# Patient Record
Sex: MALE | Race: WHITE | HISPANIC OR LATINO | URBAN - METROPOLITAN AREA
[De-identification: names, ages, dates, MRNs, and addresses within clinical notes are randomized per-mention and may not be internally consistent; named-entity substitution may affect disease eponyms.]

---

## 2022-04-05 ENCOUNTER — EMERGENCY (EMERGENCY)
Facility: HOSPITAL | Age: 72
LOS: 1 days | Discharge: HOME | End: 2022-04-05
Attending: EMERGENCY MEDICINE
Payer: SELF-PAY

## 2022-04-05 VITALS
HEIGHT: 60 IN | TEMPERATURE: 98 F | HEART RATE: 64 BPM | WEIGHT: 156.09 LBS | SYSTOLIC BLOOD PRESSURE: 144 MMHG | DIASTOLIC BLOOD PRESSURE: 84 MMHG | OXYGEN SATURATION: 99 % | RESPIRATION RATE: 16 BRPM

## 2022-04-05 PROCEDURE — 73610 X-RAY EXAM OF ANKLE: CPT | Mod: 26,50

## 2022-04-05 PROCEDURE — 99284 EMERGENCY DEPT VISIT MOD MDM: CPT | Mod: 25

## 2022-04-05 PROCEDURE — 73700 CT LOWER EXTREMITY W/O DYE: CPT | Mod: 26,RT,MD

## 2022-04-05 PROCEDURE — 90471 IMMUNIZATION ADMIN: CPT

## 2022-04-05 PROCEDURE — 73630 X-RAY EXAM OF FOOT: CPT

## 2022-04-05 PROCEDURE — 99285 EMERGENCY DEPT VISIT HI MDM: CPT

## 2022-04-05 PROCEDURE — 90714 TD VACC NO PRESV 7 YRS+ IM: CPT

## 2022-04-05 PROCEDURE — 73610 X-RAY EXAM OF ANKLE: CPT

## 2022-04-05 PROCEDURE — 96372 THER/PROPH/DIAG INJ SC/IM: CPT

## 2022-04-05 PROCEDURE — 73700 CT LOWER EXTREMITY W/O DYE: CPT | Mod: MD

## 2022-04-05 PROCEDURE — 73630 X-RAY EXAM OF FOOT: CPT | Mod: 26,RT

## 2022-04-05 RX ORDER — OXYCODONE AND ACETAMINOPHEN 5; 325 MG/1; MG/1
1 TABLET ORAL ONCE
Refills: 0 | Status: DISCONTINUED | OUTPATIENT
Start: 2022-04-05 | End: 2022-04-05

## 2022-04-05 RX ORDER — KETOROLAC TROMETHAMINE 30 MG/ML
30 SYRINGE (ML) INJECTION ONCE
Refills: 0 | Status: DISCONTINUED | OUTPATIENT
Start: 2022-04-05 | End: 2022-04-05

## 2022-04-05 RX ORDER — ACETAMINOPHEN 500 MG
650 TABLET ORAL ONCE
Refills: 0 | Status: COMPLETED | OUTPATIENT
Start: 2022-04-05 | End: 2022-04-05

## 2022-04-05 RX ORDER — TETANUS AND DIPHTHERIA TOXOIDS ADSORBED 2; 2 [LF]/.5ML; [LF]/.5ML
0.5 INJECTION INTRAMUSCULAR ONCE
Refills: 0 | Status: COMPLETED | OUTPATIENT
Start: 2022-04-05 | End: 2022-04-05

## 2022-04-05 RX ADMIN — Medication 650 MILLIGRAM(S): at 19:00

## 2022-04-05 RX ADMIN — OXYCODONE AND ACETAMINOPHEN 1 TABLET(S): 5; 325 TABLET ORAL at 23:21

## 2022-04-05 RX ADMIN — Medication 30 MILLIGRAM(S): at 19:00

## 2022-04-05 RX ADMIN — TETANUS AND DIPHTHERIA TOXOIDS ADSORBED 0.5 MILLILITER(S): 2; 2 INJECTION INTRAMUSCULAR at 19:01

## 2022-04-05 NOTE — ED PROVIDER NOTE - CARE PROVIDER_API CALL
Avinash Soto (DPM)  Orthopaedic Surgery Surgery  [address] [place] [address]  Phone: [phone]  Follow Up Time: 1-3 Days

## 2022-04-05 NOTE — ED PROVIDER NOTE - PATIENT PORTAL LINK FT
You can access the FollowMyHealth Patient Portal offered by [facility] by registering at the following website: [email or web address]. By joining [facility]’s FollowMyHealth portal, you will also be able to view your health information using other applications (apps) compatible with our system.

## 2022-04-05 NOTE — ED PROVIDER NOTE - NSFOLLOWUPINSTRUCTIONS_ED_ALL_ED_FT
- Nayana un seguimiento con bravo médico de atención primaria en 1 o 2 días. Si no puede hacer un seguimiento con bravo médico de atención primaria, regrese al servicio de urgencias por cualquier problema urgente.  - Busque atención médica inmediata ante cualquier signo o síntoma nuevo, que empeore o que le preocupe.  - Se le entregaron copias de todos los resultados de teofilo pruebas, llévelas a bravo médico de atención primaria para que revise los resultados anormales  - seguimiento con podólogo por fractura de pie y dolor de tobillo  - poner peso en la pierna izquierda según lo tolere  - solo ponga peso usando bravo talón en el pie derecho    - Si tiene dificultades para realizar un seguimiento, roland al: [phone] o visite [email or web address] para obtener un médico o especialista de [facility] que acepte bravo seguro en bravo área.    ¡Sentirse mejor!

## 2022-04-05 NOTE — ED PROVIDER NOTE - NS ED ATTENDING STATEMENT MOD
This was a shared visit with the FABIO. I reviewed and verified the documentation and independently performed the documented:

## 2022-04-05 NOTE — CONSULT NOTE ADULT - SUBJECTIVE AND OBJECTIVE BOX
HPI: Patient presents to the ED for right foot and left ankle injury.  VSS.   present for discussions with patient. no PMH.  patient states right foot was caught and crushed between a wall and a machine which resulted in him falling and sustaining left lateral ankle pain.  no other injury.  no head injury.  family bedside.  Patient unable to bear weight.  NV intact.  Non toxic.  Well appearing. No aggravating or relieving factors. No other pertinent PMH.  No other pertinent PSH.  No other pertinent FHx.  Patient denies EtOH/tobacco/illicit substance use. No chest pain/palpitations, no SOB/cough/wheeze/stridor, No abdominal pain,  No neck/back pain, no rash, no changes in neurological status/function.    Podiatry HPI: Full history as above. Podiatry consulted regarding 72y year old Male patient regarding R foot pain. patient rates his pain as 10/10, but states that it greatly improved with toradol which was given in the ED. Also notes pain to his left ankle, which he states after he fell.     Patient denies fevers, chills, nausea, or vomiting    Medications   FH,   PMH   PSH    Labs              Vital Signs Last 24 Hrs  T(C): 36.7 (05 Apr 2022 16:45), Max: 36.7 (05 Apr 2022 16:45)  T(F): 98 (05 Apr 2022 16:45), Max: 98 (05 Apr 2022 16:45)  HR: 64 (05 Apr 2022 16:45) (64 - 64)  BP: 144/84 (05 Apr 2022 16:45) (144/84 - 144/84)  BP(mean): --  RR: 16 (05 Apr 2022 16:45) (16 - 16)  SpO2: 99% (05 Apr 2022 16:45) (99% - 99%)              ROS: All others negative unless otherwise stated in the HPI    Physical exam:  Vascular: DP, PT palpable bilaterally. CFT intact to all digits  Derm: No open lesions. No clinical signs of infection. Edematous, ecchymotic changes to the dorsolateral midfoot/forefoot.   Neuro: Protective sensation grossly intact. Sensation intact to all digits.  MSK: Patient able to move all extremities. patient able to wiggle all digits. tender to palpation, light touch of the right foot dorsum. Tender to palpation over right lateral malleoli.     Assessment:  Crush injury, soft tissue swelling    Plan:  Patient evaluated, chart reviewed  Reviewed X ray, CT scans  No displacement noted. Pending official read  Applied Todd compression dressing  Pulses palpable. sensation intact, patient able to move all digits. low concern for compartment syndrome  Recommend follow up with Dr. Soto within 1 week of discharge  WBAT BLE  Stable from podiatric standpoint  Discussed with attending Dr. Soto HPI: Patient presents to the ED for right foot and left ankle injury.  VSS.   present for discussions with patient. no PMH.  patient states right foot was caught and crushed between a wall and a machine which resulted in him falling and sustaining left lateral ankle pain.  no other injury.  no head injury.  family bedside.  Patient unable to bear weight.  NV intact.  Non toxic.  Well appearing. No aggravating or relieving factors. No other pertinent PMH.  No other pertinent PSH.  No other pertinent FHx.  Patient denies EtOH/tobacco/illicit substance use. No chest pain/palpitations, no SOB/cough/wheeze/stridor, No abdominal pain,  No neck/back pain, no rash, no changes in neurological status/function.    Podiatry HPI: Full history as above. Podiatry consulted regarding 72y year old Male patient regarding R foot pain. patient rates his pain as 10/10, but states that it greatly improved with toradol which was given in the ED. Also notes pain to his left ankle, which he states after he fell.     Patient denies fevers, chills, nausea, or vomiting    Medications   FH,   PMH   PSH    Labs              Vital Signs Last 24 Hrs  T(C): 36.7 (05 Apr 2022 16:45), Max: 36.7 (05 Apr 2022 16:45)  T(F): 98 (05 Apr 2022 16:45), Max: 98 (05 Apr 2022 16:45)  HR: 64 (05 Apr 2022 16:45) (64 - 64)  BP: 144/84 (05 Apr 2022 16:45) (144/84 - 144/84)  BP(mean): --  RR: 16 (05 Apr 2022 16:45) (16 - 16)  SpO2: 99% (05 Apr 2022 16:45) (99% - 99%)              ROS: All others negative unless otherwise stated in the HPI    Physical exam:  Vascular: DP, PT palpable bilaterally. CFT intact to all digits  Derm: No open lesions. No clinical signs of infection. Edematous, ecchymotic changes to the dorsolateral midfoot/forefoot.   Neuro: Protective sensation grossly intact. Sensation intact to all digits.  MSK: Patient able to move all extremities. patient able to wiggle all digits. tender to palpation, light touch of the right foot dorsum. Tender to palpation over right lateral malleoli.     Assessment:  Crush injury, soft tissue swelling    Plan:  Patient evaluated, chart reviewed  Reviewed X ray, CT scans  No displacement noted.   CT official read showing age indeterminate fractures along right foot hallux, nondisplaced  Applied Todd compression dressing  Pulses palpable. sensation intact, patient able to move all digits. low concern for compartment syndrome  Recommend follow up with Dr. Soto within 1 week of discharge  WBAT LLE. Heel weight bearing only RLE. Recommend surgical shoe.  Stable from podiatric standpoint  Discussed with attending Dr. Soto HPI: Patient presents to the ED for right foot and left ankle injury.  VSS.   present for discussions with patient. no PMH.  patient states right foot was caught and crushed between a wall and a machine which resulted in him falling and sustaining left lateral ankle pain.  no other injury.  no head injury.  family bedside.  Patient unable to bear weight.  NV intact.  Non toxic.  Well appearing. No aggravating or relieving factors. No other pertinent PMH.  No other pertinent PSH.  No other pertinent FHx.  Patient denies EtOH/tobacco/illicit substance use. No chest pain/palpitations, no SOB/cough/wheeze/stridor, No abdominal pain,  No neck/back pain, no rash, no changes in neurological status/function.    Podiatry HPI: Full history as above. Podiatry consulted regarding 72y year old Male patient regarding R foot pain. patient rates his pain as 10/10, but states that it greatly improved with toradol which was given in the ED. Also notes pain to his left ankle, which he states after he fell.     Patient denies fevers, chills, nausea, or vomiting    Medications   FH,   PMH   PSH    Labs              Vital Signs Last 24 Hrs  T(C): 36.7 (05 Apr 2022 16:45), Max: 36.7 (05 Apr 2022 16:45)  T(F): 98 (05 Apr 2022 16:45), Max: 98 (05 Apr 2022 16:45)  HR: 64 (05 Apr 2022 16:45) (64 - 64)  BP: 144/84 (05 Apr 2022 16:45) (144/84 - 144/84)  BP(mean): --  RR: 16 (05 Apr 2022 16:45) (16 - 16)  SpO2: 99% (05 Apr 2022 16:45) (99% - 99%)              ROS: All others negative unless otherwise stated in the HPI    Physical exam:  Vascular: DP, PT palpable bilaterally. CFT intact to all digits  Derm: No open lesions. No clinical signs of infection. Edematous, ecchymotic changes to the dorsolateral midfoot/forefoot.   Neuro: Protective sensation grossly intact. Sensation intact to all digits.  MSK: Patient able to move all extremities. patient able to wiggle all digits. tender to palpation, light touch of the right foot dorsum. Tender to palpation over right lateral malleoli.     Assessment:  Crush injury, soft tissue swelling    Plan:  Patient evaluated, chart reviewed  Reviewed X ray, CT scans  No displacement noted.   CT official read showing age indeterminate fractures along right foot hallux, nondisplaced  Applied Todd compression dressing  Pulses palpable. sensation intact, patient able to move all digits. low concern for compartment syndrome  Recommend follow up with Dr. Soto within 1 week of discharge  WBAT LLE. Heel weight bearing only RLE. Recommend surgical shoe.  Stable from podiatric standpoint  Discussed with attending Dr. Soto      Patient was examined.  All documentation reviewed.  Discussed pathology and treatment plan with resident.  Reviewed written documentation and agreed with the above.  Patient will be followed while in house

## 2022-04-05 NOTE — ED PROVIDER NOTE - PHYSICAL EXAMINATION
Gen: Alert, NAD  Head: NC, AT, PERRL, EOMI, normal lids/conjunctiva  ENT: normal hearing, patent oropharynx   Neck: +supple, no tenderness/meningismus/JVD, +Trachea midline  Pulm: Bilateral BS, normal resp effort, no wheeze/stridor/retractions  CV: RRR, no R/G, +dist pulses  Abd: soft, NT/ND, +BS, no hepatosplenomegaly  Mskel: no cyanosis, left ankle with mild edema and TTP left lateral malleolus and NV intact, right foot with midfoot TTP, small abrasion between 4/5 digit and lateral foot, right foot with edema, ecchymosis, NV intact  Skin: no rash  Neuro: AAOx3, no gross sensory/motor deficits,

## 2022-04-05 NOTE — ED PROVIDER NOTE - OBJECTIVE STATEMENT
Pertinent PMH/PSH/FHx/SHx and Review of Systems contained within:  Patient presents to the ED for right foot and left ankle injury.  VSS.   present for discussions with patient. no PMH.  patient states right foot was caught and crushed between a wall and a machine which resulted in him falling and sustaining left lateral ankle pain.  no other injury.  no head injury.  family bedside.  Patient unable to bear weight.  NV intact.  Non toxic.  Well appearing. No aggravating or relieving factors. No other pertinent PMH.  No other pertinent PSH.  No other pertinent FHx.  Patient denies EtOH/tobacco/illicit substance use. No chest pain/palpitations, no SOB/cough/wheeze/stridor, No abdominal pain,  No neck/back pain, no rash, no changes in neurological status/function.

## 2022-04-05 NOTE — ED PROVIDER NOTE - PROGRESS NOTE DETAILS
Podiatry consulted.  CT pending LUCIE Watts NOTE: Pt evaluated at bedside with daughter.   Pt evaluated prior by intake physician. Otherwise HPI/PE/ROS as noted above. Will follow up plan per intake physician. LUCIE Watts NOTE: CT shows "Question age-indeterminate fracture fragments along the right lateral   hallux sesamoid/first metatarsal articulation as described." Pt seen by podiatry who recommends WBAT LLE. Heel weight bearing only RLE. Recommend surgical shoe. Pt also given cane. Pt able to ambulate, offered to stay to see physical therapy in AM but pt declined. Reviewed all results and plan. Pt stable for d/c, reports improvement, VSS, tolerating PO, ambulatory.  Discussion includes results, plan, and return precautions. Pt advised to f/u with PMD 1-2 days and specialists discussed.  Printed copies of available lab/radiology results contained within discharge packet. Pt verbalized understanding/agreement of plan. Son at bedside for translation

## 2022-04-05 NOTE — ED PROVIDER NOTE - CLINICAL SUMMARY MEDICAL DECISION MAKING FREE TEXT BOX
Patient with B ankle injury and right foot injury.  As interpreted by undersigned physician, xrays demonstrate no acute pathology. CT with concern for fracture.  Podiatry consulted and placed in splint.  will f/u.  ambulatory in ED.  FAmily will support.   present for discussions with patient. Non toxic.  Well appearing. Uneventful ED observation period. Discussed signs and symptoms and reasons for return with good teachback.

## 2022-04-05 NOTE — ED PROCEDURE NOTE - CPROC ED TIME OUT STATEMENT1
“Patient's name, , procedure and correct site were confirmed during the [place] Timeout.”
“Patient's name, , procedure and correct site were confirmed during the [place] Timeout.”

## 2022-04-05 NOTE — ED ADULT TRIAGE NOTE - CHIEF COMPLAINT QUOTE
Pt was at work and his right foot was run over with a machine causing him to fall. Pt c/o pain to right foot and left ankle

## 2022-04-05 NOTE — ED ADULT NURSE NOTE - OBJECTIVE STATEMENT
Report received at 1915, care assumed.  72 year old male presents to ED for right foot and left ankle injury.  Patient states right foot was caught and crushed between a wall and a machine which resulted in him falling and sustaining left lateral ankle pain.  no other injury.  Right foot treated and wrapped by provider.  Patient medicated for pain as ordered.  Patient medically cleared for discharge.  Patient accompanied by family member and left ED via wheelchair

## 2022-07-08 PROBLEM — Z00.00 ENCOUNTER FOR PREVENTIVE HEALTH EXAMINATION: Status: ACTIVE | Noted: 2022-07-08

## 2023-06-08 ENCOUNTER — OFFICE (OUTPATIENT)
Dept: URBAN - METROPOLITAN AREA CLINIC 104 | Facility: CLINIC | Age: 73
Setting detail: OPHTHALMOLOGY
End: 2023-06-08
Payer: COMMERCIAL

## 2023-06-08 ENCOUNTER — RX ONLY (RX ONLY)
Age: 73
End: 2023-06-08

## 2023-06-08 DIAGNOSIS — H01.001: ICD-10-CM

## 2023-06-08 DIAGNOSIS — H40.1132: ICD-10-CM

## 2023-06-08 DIAGNOSIS — H43.813: ICD-10-CM

## 2023-06-08 DIAGNOSIS — Z96.1: ICD-10-CM

## 2023-06-08 DIAGNOSIS — H01.004: ICD-10-CM

## 2023-06-08 DIAGNOSIS — H26.492: ICD-10-CM

## 2023-06-08 DIAGNOSIS — H04.123: ICD-10-CM

## 2023-06-08 PROCEDURE — 92014 COMPRE OPH EXAM EST PT 1/>: CPT | Performed by: OPTOMETRIST

## 2023-06-08 PROCEDURE — 92133 CPTRZD OPH DX IMG PST SGM ON: CPT | Performed by: OPTOMETRIST

## 2023-06-08 ASSESSMENT — CONFRONTATIONAL VISUAL FIELD TEST (CVF)
OD_FINDINGS: FULL
OS_FINDINGS: FULL

## 2023-06-08 ASSESSMENT — KERATOMETRY
OS_AXISANGLE_DEGREES: 004
OD_AXISANGLE_DEGREES: 111
OS_K2POWER_DIOPTERS: 43.38
OD_K2POWER_DIOPTERS: 43.38
OS_K1POWER_DIOPTERS: 42.88
OD_K1POWER_DIOPTERS: 42.94

## 2023-06-08 ASSESSMENT — PACHYMETRY
OS_CT_CORRECTION: 2
OD_CT_CORRECTION: 4
OD_CT_UM: 499
OS_CT_UM: 515

## 2023-06-08 ASSESSMENT — VISUAL ACUITY
OS_BCVA: 20/25
OD_BCVA: 20/20-2

## 2023-06-08 ASSESSMENT — AXIALLENGTH_DERIVED
OD_AL: 23.82
OS_AL: 23.5332

## 2023-06-08 ASSESSMENT — TONOMETRY
OS_IOP_MMHG: 20
OD_IOP_MMHG: 20

## 2023-06-08 ASSESSMENT — LID EXAM ASSESSMENTS
OS_BLEPHARITIS: LUL 1+ 2+
OD_BLEPHARITIS: RUL 1+ 2+

## 2023-06-08 ASSESSMENT — REFRACTION_AUTOREFRACTION
OD_AXIS: 021
OD_CYLINDER: -1.00
OD_SPHERE: +0.25
OS_CYLINDER: -1.00
OS_AXIS: 149
OS_SPHERE: +1.00

## 2023-06-08 ASSESSMENT — SPHEQUIV_DERIVED
OD_SPHEQUIV: -0.25
OS_SPHEQUIV: 0.5

## 2023-06-08 ASSESSMENT — SUPERFICIAL PUNCTATE KERATITIS (SPK)
OD_SPK: 1+
OS_SPK: 1+

## 2023-06-23 ENCOUNTER — OFFICE (OUTPATIENT)
Dept: URBAN - METROPOLITAN AREA CLINIC 104 | Facility: CLINIC | Age: 73
Setting detail: OPHTHALMOLOGY
End: 2023-06-23
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
## 2023-06-23 PROCEDURE — NO SHOW FE NO SHOW FEE: Performed by: OPTOMETRIST

## 2024-01-30 ENCOUNTER — OFFICE (OUTPATIENT)
Dept: URBAN - METROPOLITAN AREA CLINIC 104 | Facility: CLINIC | Age: 74
Setting detail: OPHTHALMOLOGY
End: 2024-01-30
Payer: COMMERCIAL

## 2024-01-30 DIAGNOSIS — H26.492: ICD-10-CM

## 2024-01-30 DIAGNOSIS — H04.123: ICD-10-CM

## 2024-01-30 DIAGNOSIS — H01.001: ICD-10-CM

## 2024-01-30 DIAGNOSIS — H40.1132: ICD-10-CM

## 2024-01-30 DIAGNOSIS — H01.004: ICD-10-CM

## 2024-01-30 PROCEDURE — 92083 EXTENDED VISUAL FIELD XM: CPT | Performed by: OPTOMETRIST

## 2024-01-30 PROCEDURE — 92012 INTRM OPH EXAM EST PATIENT: CPT | Performed by: OPTOMETRIST

## 2024-01-30 ASSESSMENT — REFRACTION_MANIFEST
OD_CYLINDER: -1.00
OD_SPHERE: PLANO
OS_VA1: 20/20
OS_SPHERE: PLANO
OD_AXIS: 020
OS_AXIS: 165
OD_ADD: +2.50
OD_VA1: 20/20
OS_ADD: +2.50
OS_CYLINDER: -0.50

## 2024-01-30 ASSESSMENT — REFRACTION_AUTOREFRACTION
OD_SPHERE: +0.50
OS_CYLINDER: -1.00
OS_SPHERE: +0.75
OD_AXIS: 026
OD_CYLINDER: -1.00
OS_AXIS: 140

## 2024-01-30 ASSESSMENT — SPHEQUIV_DERIVED
OD_SPHEQUIV: 0
OS_SPHEQUIV: 0.25

## 2024-01-30 ASSESSMENT — SUPERFICIAL PUNCTATE KERATITIS (SPK)
OS_SPK: 2+
OD_SPK: 2+

## 2024-01-30 ASSESSMENT — CONFRONTATIONAL VISUAL FIELD TEST (CVF)
OS_FINDINGS: FULL
OD_FINDINGS: FULL

## 2024-01-30 ASSESSMENT — LID EXAM ASSESSMENTS
OS_BLEPHARITIS: LUL 1+ 2+
OD_BLEPHARITIS: RUL 1+ 2+

## 2024-05-30 ENCOUNTER — OFFICE (OUTPATIENT)
Dept: URBAN - METROPOLITAN AREA CLINIC 104 | Facility: CLINIC | Age: 74
Setting detail: OPHTHALMOLOGY
End: 2024-05-30
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
## 2024-05-30 PROCEDURE — NO SHOW FE NO SHOW FEE: Performed by: OPTOMETRIST

## 2025-04-09 ENCOUNTER — OFFICE (OUTPATIENT)
Dept: URBAN - METROPOLITAN AREA CLINIC 104 | Facility: CLINIC | Age: 75
Setting detail: OPHTHALMOLOGY
End: 2025-04-09
Payer: COMMERCIAL

## 2025-04-09 DIAGNOSIS — H04.123: ICD-10-CM

## 2025-04-09 DIAGNOSIS — H01.001: ICD-10-CM

## 2025-04-09 DIAGNOSIS — H40.1132: ICD-10-CM

## 2025-04-09 DIAGNOSIS — H01.004: ICD-10-CM

## 2025-04-09 PROBLEM — H11.042 PTERYGIUM-PERIPHERAL; LEFT EYE: Status: ACTIVE | Noted: 2025-04-09

## 2025-04-09 PROCEDURE — 99213 OFFICE O/P EST LOW 20 MIN: CPT | Performed by: OPTOMETRIST

## 2025-04-09 ASSESSMENT — SUPERFICIAL PUNCTATE KERATITIS (SPK)
OD_SPK: 2+
OS_SPK: 2+

## 2025-04-09 ASSESSMENT — PACHYMETRY
OD_CT_CORRECTION: 4
OS_CT_CORRECTION: 2
OS_CT_UM: 515
OD_CT_UM: 499

## 2025-04-09 ASSESSMENT — LID EXAM ASSESSMENTS
OS_BLEPHARITIS: LUL 1+ 2+
OD_BLEPHARITIS: RUL 1+ 2+

## 2025-04-09 ASSESSMENT — TONOMETRY
OD_IOP_MMHG: 18
OS_IOP_MMHG: 17

## 2025-04-09 ASSESSMENT — CONFRONTATIONAL VISUAL FIELD TEST (CVF)
OD_FINDINGS: FULL
OS_FINDINGS: FULL

## 2025-04-09 ASSESSMENT — VISUAL ACUITY
OD_BCVA: 20/20
OS_BCVA: 20/20

## 2025-04-09 ASSESSMENT — CORNEAL PTERYGIUM: OS_PTERYGIUM: NASAL

## 2025-04-27 ENCOUNTER — EMERGENCY (EMERGENCY)
Facility: HOSPITAL | Age: 75
LOS: 1 days | End: 2025-04-27
Attending: EMERGENCY MEDICINE
Payer: COMMERCIAL

## 2025-04-27 VITALS
DIASTOLIC BLOOD PRESSURE: 80 MMHG | OXYGEN SATURATION: 99 % | WEIGHT: 142.2 LBS | HEART RATE: 57 BPM | RESPIRATION RATE: 18 BRPM | SYSTOLIC BLOOD PRESSURE: 156 MMHG | TEMPERATURE: 98 F | HEIGHT: 60 IN

## 2025-04-27 LAB
ALBUMIN SERPL ELPH-MCNC: 3.8 G/DL — SIGNIFICANT CHANGE UP (ref 3.3–5.2)
ALP SERPL-CCNC: 96 U/L — SIGNIFICANT CHANGE UP (ref 40–120)
ALT FLD-CCNC: 22 U/L — SIGNIFICANT CHANGE UP
ANION GAP SERPL CALC-SCNC: 10 MMOL/L — SIGNIFICANT CHANGE UP (ref 5–17)
AST SERPL-CCNC: 18 U/L — SIGNIFICANT CHANGE UP
BASOPHILS # BLD AUTO: 0.03 K/UL — SIGNIFICANT CHANGE UP (ref 0–0.2)
BASOPHILS NFR BLD AUTO: 0.4 % — SIGNIFICANT CHANGE UP (ref 0–2)
BILIRUB SERPL-MCNC: 0.2 MG/DL — LOW (ref 0.4–2)
BUN SERPL-MCNC: 16.5 MG/DL — SIGNIFICANT CHANGE UP (ref 8–20)
CALCIUM SERPL-MCNC: 9.2 MG/DL — SIGNIFICANT CHANGE UP (ref 8.4–10.5)
CHLORIDE SERPL-SCNC: 101 MMOL/L — SIGNIFICANT CHANGE UP (ref 96–108)
CO2 SERPL-SCNC: 28 MMOL/L — SIGNIFICANT CHANGE UP (ref 22–29)
CREAT SERPL-MCNC: 1.02 MG/DL — SIGNIFICANT CHANGE UP (ref 0.5–1.3)
D DIMER BLD IA.RAPID-MCNC: <150 NG/ML DDU — SIGNIFICANT CHANGE UP
EGFR: 76 ML/MIN/1.73M2 — SIGNIFICANT CHANGE UP
EGFR: 76 ML/MIN/1.73M2 — SIGNIFICANT CHANGE UP
EOSINOPHIL # BLD AUTO: 0.09 K/UL — SIGNIFICANT CHANGE UP (ref 0–0.5)
EOSINOPHIL NFR BLD AUTO: 1.3 % — SIGNIFICANT CHANGE UP (ref 0–6)
FLUAV AG NPH QL: SIGNIFICANT CHANGE UP
FLUBV AG NPH QL: SIGNIFICANT CHANGE UP
GLUCOSE SERPL-MCNC: 95 MG/DL — SIGNIFICANT CHANGE UP (ref 70–99)
HCT VFR BLD CALC: 34.8 % — LOW (ref 39–50)
HGB BLD-MCNC: 11.5 G/DL — LOW (ref 13–17)
IMM GRANULOCYTES # BLD AUTO: 0.03 K/UL — SIGNIFICANT CHANGE UP (ref 0–0.07)
IMM GRANULOCYTES NFR BLD AUTO: 0.4 % — SIGNIFICANT CHANGE UP (ref 0–0.9)
LYMPHOCYTES # BLD AUTO: 1.16 K/UL — SIGNIFICANT CHANGE UP (ref 1–3.3)
LYMPHOCYTES NFR BLD AUTO: 16.8 % — SIGNIFICANT CHANGE UP (ref 13–44)
MCHC RBC-ENTMCNC: 30.7 PG — SIGNIFICANT CHANGE UP (ref 27–34)
MCHC RBC-ENTMCNC: 33 G/DL — SIGNIFICANT CHANGE UP (ref 32–36)
MCV RBC AUTO: 92.8 FL — SIGNIFICANT CHANGE UP (ref 80–100)
MONOCYTES # BLD AUTO: 0.74 K/UL — SIGNIFICANT CHANGE UP (ref 0–0.9)
MONOCYTES NFR BLD AUTO: 10.7 % — SIGNIFICANT CHANGE UP (ref 2–14)
NEUTROPHILS # BLD AUTO: 4.87 K/UL — SIGNIFICANT CHANGE UP (ref 1.8–7.4)
NEUTROPHILS NFR BLD AUTO: 70.4 % — SIGNIFICANT CHANGE UP (ref 43–77)
NRBC # BLD AUTO: 0 K/UL — SIGNIFICANT CHANGE UP (ref 0–0)
NRBC # FLD: 0 K/UL — SIGNIFICANT CHANGE UP (ref 0–0)
NRBC BLD AUTO-RTO: 0 /100 WBCS — SIGNIFICANT CHANGE UP (ref 0–0)
NT-PROBNP SERPL-SCNC: 170 PG/ML — SIGNIFICANT CHANGE UP (ref 0–300)
PLATELET # BLD AUTO: 254 K/UL — SIGNIFICANT CHANGE UP (ref 150–400)
PMV BLD: 10.8 FL — SIGNIFICANT CHANGE UP (ref 7–13)
POTASSIUM SERPL-MCNC: 4.1 MMOL/L — SIGNIFICANT CHANGE UP (ref 3.5–5.3)
POTASSIUM SERPL-SCNC: 4.1 MMOL/L — SIGNIFICANT CHANGE UP (ref 3.5–5.3)
PROT SERPL-MCNC: 6.2 G/DL — LOW (ref 6.6–8.7)
RBC # BLD: 3.75 M/UL — LOW (ref 4.2–5.8)
RBC # FLD: 13.2 % — SIGNIFICANT CHANGE UP (ref 10.3–14.5)
RSV RNA NPH QL NAA+NON-PROBE: SIGNIFICANT CHANGE UP
SARS-COV-2 RNA SPEC QL NAA+PROBE: SIGNIFICANT CHANGE UP
SODIUM SERPL-SCNC: 139 MMOL/L — SIGNIFICANT CHANGE UP (ref 135–145)
SOURCE RESPIRATORY: SIGNIFICANT CHANGE UP
TROPONIN T, HIGH SENSITIVITY RESULT: 10 NG/L — SIGNIFICANT CHANGE UP (ref 0–51)
TROPONIN T, HIGH SENSITIVITY RESULT: 12 NG/L — SIGNIFICANT CHANGE UP (ref 0–51)
TROPONIN T, HIGH SENSITIVITY RESULT: 12 NG/L — SIGNIFICANT CHANGE UP (ref 0–51)
WBC # BLD: 6.92 K/UL — SIGNIFICANT CHANGE UP (ref 3.8–10.5)
WBC # FLD AUTO: 6.92 K/UL — SIGNIFICANT CHANGE UP (ref 3.8–10.5)

## 2025-04-27 PROCEDURE — 71250 CT THORAX DX C-: CPT | Mod: 26

## 2025-04-27 PROCEDURE — 93010 ELECTROCARDIOGRAM REPORT: CPT

## 2025-04-27 PROCEDURE — 71046 X-RAY EXAM CHEST 2 VIEWS: CPT | Mod: 26

## 2025-04-27 PROCEDURE — 99285 EMERGENCY DEPT VISIT HI MDM: CPT

## 2025-04-27 PROCEDURE — 74177 CT ABD & PELVIS W/CONTRAST: CPT | Mod: 26

## 2025-04-27 PROCEDURE — 93306 TTE W/DOPPLER COMPLETE: CPT | Mod: 26

## 2025-04-27 RX ORDER — DORZOLAMIDE 20 MG/ML
1 SOLUTION/ DROPS OPHTHALMIC
Refills: 0 | Status: DISCONTINUED | OUTPATIENT
Start: 2025-04-27 | End: 2025-05-04

## 2025-04-27 RX ORDER — TAMSULOSIN HYDROCHLORIDE 0.4 MG/1
0.4 CAPSULE ORAL AT BEDTIME
Refills: 0 | Status: DISCONTINUED | OUTPATIENT
Start: 2025-04-27 | End: 2025-05-04

## 2025-04-27 RX ORDER — ATORVASTATIN CALCIUM 80 MG/1
20 TABLET, FILM COATED ORAL AT BEDTIME
Refills: 0 | Status: DISCONTINUED | OUTPATIENT
Start: 2025-04-27 | End: 2025-05-04

## 2025-04-27 RX ORDER — ALBUTEROL SULFATE 2.5 MG/3ML
2.5 VIAL, NEBULIZER (ML) INHALATION ONCE
Refills: 0 | Status: COMPLETED | OUTPATIENT
Start: 2025-04-27 | End: 2025-04-27

## 2025-04-27 RX ORDER — ACETAMINOPHEN 500 MG/5ML
1000 LIQUID (ML) ORAL ONCE
Refills: 0 | Status: COMPLETED | OUTPATIENT
Start: 2025-04-27 | End: 2025-04-27

## 2025-04-27 RX ADMIN — Medication 2.5 MILLIGRAM(S): at 13:07

## 2025-04-27 RX ADMIN — Medication 400 MILLIGRAM(S): at 13:07

## 2025-04-28 ENCOUNTER — RESULT REVIEW (OUTPATIENT)
Age: 75
End: 2025-04-28

## 2025-04-28 ENCOUNTER — TRANSCRIPTION ENCOUNTER (OUTPATIENT)
Age: 75
End: 2025-04-28

## 2025-04-28 VITALS
RESPIRATION RATE: 17 BRPM | SYSTOLIC BLOOD PRESSURE: 142 MMHG | TEMPERATURE: 98 F | DIASTOLIC BLOOD PRESSURE: 82 MMHG | OXYGEN SATURATION: 96 % | HEART RATE: 57 BPM

## 2025-04-28 LAB
APPEARANCE UR: CLEAR — SIGNIFICANT CHANGE UP
BACTERIA # UR AUTO: NEGATIVE /HPF — SIGNIFICANT CHANGE UP
BILIRUB UR-MCNC: NEGATIVE — SIGNIFICANT CHANGE UP
CAST: 0 /LPF — SIGNIFICANT CHANGE UP (ref 0–4)
COLOR SPEC: YELLOW — SIGNIFICANT CHANGE UP
DIFF PNL FLD: NEGATIVE — SIGNIFICANT CHANGE UP
GLUCOSE UR QL: NEGATIVE MG/DL — SIGNIFICANT CHANGE UP
KETONES UR-MCNC: NEGATIVE MG/DL — SIGNIFICANT CHANGE UP
LEUKOCYTE ESTERASE UR-ACNC: NEGATIVE — SIGNIFICANT CHANGE UP
NITRITE UR-MCNC: NEGATIVE — SIGNIFICANT CHANGE UP
PH UR: 8 — SIGNIFICANT CHANGE UP (ref 5–8)
PROT UR-MCNC: SIGNIFICANT CHANGE UP MG/DL
RBC CASTS # UR COMP ASSIST: 2 /HPF — SIGNIFICANT CHANGE UP (ref 0–4)
SP GR SPEC: >1.03 — HIGH (ref 1–1.03)
SQUAMOUS # UR AUTO: 0 /HPF — SIGNIFICANT CHANGE UP (ref 0–5)
UROBILINOGEN FLD QL: 1 MG/DL — SIGNIFICANT CHANGE UP (ref 0.2–1)
WBC UR QL: 0 /HPF — SIGNIFICANT CHANGE UP (ref 0–5)

## 2025-04-28 PROCEDURE — 96374 THER/PROPH/DIAG INJ IV PUSH: CPT | Mod: XU

## 2025-04-28 PROCEDURE — 94640 AIRWAY INHALATION TREATMENT: CPT

## 2025-04-28 PROCEDURE — 75571 CT HRT W/O DYE W/CA TEST: CPT | Mod: 26

## 2025-04-28 PROCEDURE — 78452 HT MUSCLE IMAGE SPECT MULT: CPT | Mod: 26

## 2025-04-28 PROCEDURE — 93306 TTE W/DOPPLER COMPLETE: CPT

## 2025-04-28 PROCEDURE — 93010 ELECTROCARDIOGRAM REPORT: CPT

## 2025-04-28 PROCEDURE — 99285 EMERGENCY DEPT VISIT HI MDM: CPT | Mod: 25

## 2025-04-28 PROCEDURE — G0378: CPT

## 2025-04-28 PROCEDURE — 93017 CV STRESS TEST TRACING ONLY: CPT

## 2025-04-28 PROCEDURE — 93016 CV STRESS TEST SUPVJ ONLY: CPT

## 2025-04-28 PROCEDURE — 93005 ELECTROCARDIOGRAM TRACING: CPT

## 2025-04-28 PROCEDURE — 81001 URINALYSIS AUTO W/SCOPE: CPT

## 2025-04-28 PROCEDURE — 71250 CT THORAX DX C-: CPT | Mod: MC

## 2025-04-28 PROCEDURE — 36415 COLL VENOUS BLD VENIPUNCTURE: CPT

## 2025-04-28 PROCEDURE — A9500: CPT

## 2025-04-28 PROCEDURE — 87086 URINE CULTURE/COLONY COUNT: CPT

## 2025-04-28 PROCEDURE — 71046 X-RAY EXAM CHEST 2 VIEWS: CPT

## 2025-04-28 PROCEDURE — 99236 HOSP IP/OBS SAME DATE HI 85: CPT

## 2025-04-28 PROCEDURE — 87637 SARSCOV2&INF A&B&RSV AMP PRB: CPT

## 2025-04-28 PROCEDURE — 93018 CV STRESS TEST I&R ONLY: CPT

## 2025-04-28 PROCEDURE — 80053 COMPREHEN METABOLIC PANEL: CPT

## 2025-04-28 PROCEDURE — T1013: CPT

## 2025-04-28 PROCEDURE — 99284 EMERGENCY DEPT VISIT MOD MDM: CPT

## 2025-04-28 PROCEDURE — 83880 ASSAY OF NATRIURETIC PEPTIDE: CPT

## 2025-04-28 PROCEDURE — 85025 COMPLETE CBC W/AUTO DIFF WBC: CPT

## 2025-04-28 PROCEDURE — 85379 FIBRIN DEGRADATION QUANT: CPT

## 2025-04-28 PROCEDURE — 74177 CT ABD & PELVIS W/CONTRAST: CPT | Mod: MC

## 2025-04-28 PROCEDURE — 75571 CT HRT W/O DYE W/CA TEST: CPT | Mod: MC

## 2025-04-28 PROCEDURE — 84484 ASSAY OF TROPONIN QUANT: CPT

## 2025-04-28 PROCEDURE — 78452 HT MUSCLE IMAGE SPECT MULT: CPT | Mod: MC

## 2025-04-28 RX ORDER — BENZONATATE 100 MG
100 CAPSULE ORAL THREE TIMES A DAY
Refills: 0 | Status: DISCONTINUED | OUTPATIENT
Start: 2025-04-28 | End: 2025-05-04

## 2025-04-28 RX ORDER — IBUPROFEN 200 MG
600 TABLET ORAL EVERY 6 HOURS
Refills: 0 | Status: DISCONTINUED | OUTPATIENT
Start: 2025-04-28 | End: 2025-04-28

## 2025-04-28 RX ORDER — ACETAMINOPHEN 500 MG/5ML
650 LIQUID (ML) ORAL EVERY 4 HOURS
Refills: 0 | Status: DISCONTINUED | OUTPATIENT
Start: 2025-04-28 | End: 2025-05-04

## 2025-04-28 RX ADMIN — DORZOLAMIDE 1 DROP(S): 20 SOLUTION/ DROPS OPHTHALMIC at 02:05

## 2025-04-28 RX ADMIN — Medication 100 MILLIGRAM(S): at 02:01

## 2025-04-28 RX ADMIN — DORZOLAMIDE 1 DROP(S): 20 SOLUTION/ DROPS OPHTHALMIC at 21:17

## 2025-04-28 RX ADMIN — TAMSULOSIN HYDROCHLORIDE 0.4 MILLIGRAM(S): 0.4 CAPSULE ORAL at 02:01

## 2025-04-28 RX ADMIN — Medication 100 MILLIGRAM(S): at 05:35

## 2025-04-28 RX ADMIN — Medication 40 MILLIGRAM(S): at 15:09

## 2025-04-28 RX ADMIN — TAMSULOSIN HYDROCHLORIDE 0.4 MILLIGRAM(S): 0.4 CAPSULE ORAL at 21:15

## 2025-04-28 RX ADMIN — Medication 100 MILLIGRAM(S): at 21:15

## 2025-04-28 RX ADMIN — Medication 100 MILLIGRAM(S): at 15:09

## 2025-04-28 RX ADMIN — ATORVASTATIN CALCIUM 20 MILLIGRAM(S): 80 TABLET, FILM COATED ORAL at 21:15

## 2025-04-28 RX ADMIN — Medication 650 MILLIGRAM(S): at 21:15

## 2025-04-28 RX ADMIN — DORZOLAMIDE 1 DROP(S): 20 SOLUTION/ DROPS OPHTHALMIC at 16:36

## 2025-04-29 LAB
CULTURE RESULTS: SIGNIFICANT CHANGE UP
SPECIMEN SOURCE: SIGNIFICANT CHANGE UP

## 2025-05-08 ENCOUNTER — NON-APPOINTMENT (OUTPATIENT)
Age: 75
End: 2025-05-08

## 2025-05-08 ENCOUNTER — APPOINTMENT (OUTPATIENT)
Dept: CARDIOLOGY | Facility: CLINIC | Age: 75
End: 2025-05-08

## 2025-05-08 VITALS
HEIGHT: 64 IN | SYSTOLIC BLOOD PRESSURE: 150 MMHG | WEIGHT: 142 LBS | BODY MASS INDEX: 24.24 KG/M2 | DIASTOLIC BLOOD PRESSURE: 82 MMHG

## 2025-05-08 VITALS — SYSTOLIC BLOOD PRESSURE: 144 MMHG | OXYGEN SATURATION: 96 % | HEART RATE: 50 BPM | DIASTOLIC BLOOD PRESSURE: 84 MMHG

## 2025-05-08 DIAGNOSIS — R07.0 PAIN IN THROAT: ICD-10-CM

## 2025-05-08 DIAGNOSIS — R93.1 ABNORMAL FINDINGS ON DIAGNOSTIC IMAGING OF HEART AND CORONARY CIRCULATION: ICD-10-CM

## 2025-05-08 DIAGNOSIS — E78.5 HYPERLIPIDEMIA, UNSPECIFIED: ICD-10-CM

## 2025-05-08 DIAGNOSIS — R09.89 OTHER SPECIFIED SYMPTOMS AND SIGNS INVOLVING THE CIRCULATORY AND RESPIRATORY SYSTEMS: ICD-10-CM

## 2025-05-08 DIAGNOSIS — Z87.891 PERSONAL HISTORY OF NICOTINE DEPENDENCE: ICD-10-CM

## 2025-05-08 DIAGNOSIS — R07.9 CHEST PAIN, UNSPECIFIED: ICD-10-CM

## 2025-05-08 DIAGNOSIS — Z78.9 OTHER SPECIFIED HEALTH STATUS: ICD-10-CM

## 2025-05-08 DIAGNOSIS — Z00.00 ENCOUNTER FOR GENERAL ADULT MEDICAL EXAMINATION W/OUT ABNORMAL FINDINGS: ICD-10-CM

## 2025-05-08 DIAGNOSIS — R13.10 DYSPHAGIA, UNSPECIFIED: ICD-10-CM

## 2025-05-08 PROCEDURE — 93000 ELECTROCARDIOGRAM COMPLETE: CPT

## 2025-05-08 PROCEDURE — 99214 OFFICE O/P EST MOD 30 MIN: CPT

## 2025-05-08 RX ORDER — ROSUVASTATIN CALCIUM 10 MG/1
10 TABLET, FILM COATED ORAL
Qty: 90 | Refills: 3 | Status: ACTIVE | COMMUNITY
Start: 2025-05-08 | End: 1900-01-01

## 2025-05-08 RX ORDER — BENZONATATE 100 MG/1
100 CAPSULE ORAL 3 TIMES DAILY
Qty: 60 | Refills: 0 | Status: ACTIVE | COMMUNITY
Start: 2025-05-08 | End: 1900-01-01

## 2025-05-08 RX ORDER — ASPIRIN 81 MG/1
81 TABLET, COATED ORAL
Qty: 90 | Refills: 2 | Status: ACTIVE | COMMUNITY
Start: 2025-05-08 | End: 1900-01-01

## 2025-05-08 RX ORDER — OMEPRAZOLE 40 MG/1
40 CAPSULE, DELAYED RELEASE ORAL DAILY
Refills: 0 | Status: ACTIVE | COMMUNITY

## 2025-05-08 RX ORDER — TAMSULOSIN HYDROCHLORIDE 0.4 MG/1
0.4 CAPSULE ORAL DAILY
Refills: 0 | Status: ACTIVE | COMMUNITY

## 2025-05-13 LAB
25(OH)D3 SERPL-MCNC: 99.3 NG/ML
APO B SERPL-MCNC: 96 MG/DL
CHOLEST SERPL-MCNC: 186 MG/DL
HDLC SERPL-MCNC: 44 MG/DL
LDLC SERPL-MCNC: 123 MG/DL
NONHDLC SERPL-MCNC: 142 MG/DL
NT-PROBNP SERPL-MCNC: 55 PG/ML
TRIGL SERPL-MCNC: 107 MG/DL

## 2025-05-15 LAB — APO LP(A) SERPL-MCNC: 56.8 NMOL/L

## 2025-05-20 ENCOUNTER — APPOINTMENT (OUTPATIENT)
Dept: GASTROENTEROLOGY | Facility: CLINIC | Age: 75
End: 2025-05-20

## 2025-08-14 ENCOUNTER — OFFICE (OUTPATIENT)
Dept: URBAN - METROPOLITAN AREA CLINIC 104 | Facility: CLINIC | Age: 75
Setting detail: OPHTHALMOLOGY
End: 2025-08-14
Payer: COMMERCIAL

## 2025-08-14 DIAGNOSIS — H40.1132: ICD-10-CM

## 2025-08-14 DIAGNOSIS — H52.4: ICD-10-CM

## 2025-08-14 DIAGNOSIS — H11.042: ICD-10-CM

## 2025-08-14 DIAGNOSIS — H01.001: ICD-10-CM

## 2025-08-14 DIAGNOSIS — H01.004: ICD-10-CM

## 2025-08-14 PROCEDURE — 92012 INTRM OPH EXAM EST PATIENT: CPT | Performed by: SPECIALIST

## 2025-08-14 PROCEDURE — 92015 DETERMINE REFRACTIVE STATE: CPT | Performed by: SPECIALIST

## 2025-08-14 ASSESSMENT — TONOMETRY
OS_IOP_MMHG: 17
OD_IOP_MMHG: 18

## 2025-08-14 ASSESSMENT — REFRACTION_MANIFEST
OD_VA1: 20/25
OS_VA1: 20/25
OU_VA: 20/20
OS_AXIS: 135
OS_SPHERE: +1.50
OS_CYLINDER: -0.50
OD_CYLINDER: -0.50
OD_SPHERE: +1.50
OS_ADD: +2.50
OD_AXIS: 62
OD_ADD: +2.50

## 2025-08-14 ASSESSMENT — SUPERFICIAL PUNCTATE KERATITIS (SPK)
OS_SPK: 2+
OD_SPK: 2+

## 2025-08-14 ASSESSMENT — KERATOMETRY
OD_K1POWER_DIOPTERS: 42.88
OS_K2POWER_DIOPTERS: 43.55
METHOD_AUTO_MANUAL: AUTO
OD_AXISANGLE_DEGREES: 149
OD_K2POWER_DIOPTERS: 43.32
OS_K1POWER_DIOPTERS: 42.67
OS_AXISANGLE_DEGREES: 005

## 2025-08-14 ASSESSMENT — CONFRONTATIONAL VISUAL FIELD TEST (CVF)
OD_FINDINGS: FULL
OS_FINDINGS: FULL

## 2025-08-14 ASSESSMENT — REFRACTION_AUTOREFRACTION
OD_CYLINDER: -0.75
OS_AXIS: 100
OS_SPHERE: +1.00
OD_SPHERE: +0.75
OS_CYLINDER: -0.75
OD_AXIS: 062

## 2025-08-14 ASSESSMENT — LID EXAM ASSESSMENTS
OD_BLEPHARITIS: RUL 1+ 2+
OS_BLEPHARITIS: LUL 1+ 2+

## 2025-08-14 ASSESSMENT — VISUAL ACUITY
OD_BCVA: 20/25
OS_BCVA: 20/30

## 2025-08-14 ASSESSMENT — CORNEAL PTERYGIUM: OS_PTERYGIUM: NASAL

## 2025-08-14 ASSESSMENT — PACHYMETRY
OS_CT_CORRECTION: 2
OS_CT_UM: 515
OD_CT_CORRECTION: 4
OD_CT_UM: 499

## 2025-09-16 ENCOUNTER — APPOINTMENT (OUTPATIENT)
Dept: CARDIOLOGY | Facility: CLINIC | Age: 75
End: 2025-09-16